# Patient Record
Sex: MALE | Race: BLACK OR AFRICAN AMERICAN | NOT HISPANIC OR LATINO | Employment: UNEMPLOYED | URBAN - METROPOLITAN AREA
[De-identification: names, ages, dates, MRNs, and addresses within clinical notes are randomized per-mention and may not be internally consistent; named-entity substitution may affect disease eponyms.]

---

## 2018-03-02 ENCOUNTER — HOSPITAL ENCOUNTER (EMERGENCY)
Facility: MEDICAL CENTER | Age: 28
End: 2018-03-02
Attending: EMERGENCY MEDICINE
Payer: MEDICAID

## 2018-03-02 VITALS
DIASTOLIC BLOOD PRESSURE: 106 MMHG | SYSTOLIC BLOOD PRESSURE: 146 MMHG | BODY MASS INDEX: 23.86 KG/M2 | HEART RATE: 89 BPM | RESPIRATION RATE: 18 BRPM | HEIGHT: 72 IN | TEMPERATURE: 97.5 F | WEIGHT: 176.15 LBS

## 2018-03-02 DIAGNOSIS — K08.89 PAIN, DENTAL: ICD-10-CM

## 2018-03-02 PROCEDURE — 700102 HCHG RX REV CODE 250 W/ 637 OVERRIDE(OP): Performed by: EMERGENCY MEDICINE

## 2018-03-02 PROCEDURE — 99283 EMERGENCY DEPT VISIT LOW MDM: CPT

## 2018-03-02 PROCEDURE — A9270 NON-COVERED ITEM OR SERVICE: HCPCS | Performed by: EMERGENCY MEDICINE

## 2018-03-02 RX ORDER — AMOXICILLIN 500 MG/1
500 CAPSULE ORAL 3 TIMES DAILY
Qty: 21 CAP | Refills: 0 | Status: SHIPPED | OUTPATIENT
Start: 2018-03-02 | End: 2018-03-09

## 2018-03-02 RX ORDER — AMOXICILLIN 250 MG/1
500 CAPSULE ORAL ONCE
Status: COMPLETED | OUTPATIENT
Start: 2018-03-02 | End: 2018-03-02

## 2018-03-02 RX ADMIN — AMOXICILLIN 500 MG: 250 CAPSULE ORAL at 17:57

## 2018-03-02 ASSESSMENT — PAIN SCALES - GENERAL: PAINLEVEL_OUTOF10: 9

## 2018-03-03 NOTE — DISCHARGE INSTRUCTIONS
Please follow-up with your primary care provider for blood pressure management.      Dental Pain  Dental pain may be caused by many things, including:  · Tooth decay (cavities or caries). Cavities expose the nerve of your tooth to air and hot or cold temperatures. This can cause pain or discomfort.  · Abscess or infection. A dental abscess is a collection of infected pus from a bacterial infection in the inner part of the tooth (pulp). It usually occurs at the end of the tooth’s root.  · Injury.  · An unknown reason (idiopathic).  Your pain may be mild or severe. It may only occur when:  · You are chewing.  · You are exposed to hot or cold temperature.  · You are eating or drinking sugary foods or beverages, such as soda or candy.  Your pain may also be constant.  Follow these instructions at home:  Watch your dental pain for any changes. The following actions may help to lessen any discomfort that you are feeling:  · Take medicines only as directed by your dentist.  · If you were prescribed an antibiotic medicine, finish all of it even if you start to feel better.  · Keep all follow-up visits as directed by your dentist. This is important.  · Do not apply heat to the outside of your face.  · Rinse your mouth or gargle with salt water if directed by your dentist. This helps with pain and swelling.  ¨ You can make salt water by adding ¼ tsp of salt to 1 cup of warm water.  · Apply ice to the painful area of your face:  ¨ Put ice in a plastic bag.  ¨ Place a towel between your skin and the bag.  ¨ Leave the ice on for 20 minutes, 2-3 times per day.  · Avoid foods or drinks that cause you pain, such as:  ¨ Very hot or very cold foods or drinks.  ¨ Sweet or sugary foods or drinks.  Contact a health care provider if:  · Your pain is not controlled with medicines.  · Your symptoms are worse.  · You have new symptoms.  Get help right away if:  · You are unable to open your mouth.  · You are having trouble breathing or  swallowing.  · You have a fever.  · Your face, neck, or jaw is swollen.  This information is not intended to replace advice given to you by your health care provider. Make sure you discuss any questions you have with your health care provider.  Document Released: 12/18/2006 Document Revised: 04/27/2017 Document Reviewed: 12/14/2015  [employer] Interactive Patient Education © 2017 [employer]

## 2018-03-03 NOTE — ED NOTES
Pt reports right upper jaw pain x 3 days. Reports pain worse today. Swelling noted to right upper jaw states pain radiates to right ear. States n/v today. Denies fever or chills. Skinpwd. Aa0x3. resp nonlabored.

## 2022-01-23 NOTE — ED PROVIDER NOTES
ED Provider Note      CHIEF COMPLAINT  Chief Complaint   Patient presents with   • Dental Pain       HPI  Renaldo Angeles is a 27 y.o. male who presents with dental pain. Pain started 3-4 days ago. Pain is located left posterior aspect. Pain is severe. Constant. It is getting worse. No fevers. No neck stiffness or headache. Swelling was present but how has gone down. Pain does radiate up into the ear and into the neck      REVIEW OF SYSTEMS  Pertinent negative: No fevers, runny nose, cough, congestion.    See HPI    PAST MEDICAL HISTORY  Past Medical History:   Diagnosis Date   • Hypertension        SOCIAL HISTORY  Social History   Substance Use Topics   • Smoking status: Current Every Day Smoker     Packs/day: 0.50   • Smokeless tobacco: Never Used   • Alcohol use No       ALLERGIES  Allergies   Allergen Reactions   • Aspirin Rash     Generalized     • Sulfa Drugs Rash     Generalized         PHYSICAL EXAM  VITAL SIGNS: /106   Pulse 89   Temp 36.4 °C (97.5 °F)   Resp 18   Ht 1.829 m (6')   Wt 79.9 kg (176 lb 2.4 oz)   BMI 23.89 kg/m²   Constitutional: Well developed, Well nourished, No acute distress, Non-toxic appearance.   HENT:  Atraumatic, Normocephalic. Bilateral external ears normal, Oropharynx with left mandibular wisdom tooth impacted interrupting with slight soft tissue swelling and tenderness to palpation no fluctuance or purulence  Eyes: Grossly Normal inspection  Neck: Normal range of motion  Cardiovascular: Normal heart rate  Thorax & Lungs: No respiratory distress  Skin: No rash.     COURSE & MEDICAL DECISION MAKING  Patient with pain from an impacted wisdom tooth, questionable infection associated with it. No sign of abscess. We'll treat with amoxicillin, discussed with them for Motrin and Tylenol. I have advised followup with dentist as soon as possible. Asked to return to the emergency department for swelling, high fevers, or concern.    Patient referred to primary provider  for blood pressure management    FINAL IMPRESSION  1. dental caries      This dictation was created using voice recognition software. The accuracy of the dictation is limited to the abilities of the software.   The nursing notes were reviewed and certain aspects of this information were incorporated into this note.      Electronically signed by: Mike Amezcua, 3/2/2018 5:51 PM       no